# Patient Record
(demographics unavailable — no encounter records)

---

## 2025-03-07 NOTE — HISTORY OF PRESENT ILLNESS
[Father] : father [Grade ___] : Grade [unfilled] [Fruit] : fruit [Toilet Trained] : toilet trained [Normal] : Normal [In own bed] : In own bed [Brushing teeth] : Brushing teeth [Yes] : Patient goes to dentist yearly [Child Cooperates] : Child cooperates [No difficulties with Homework] : No difficulties with homework [Adequate performance] : Adequate performance [Adequate attention] : Adequate attention [No] : No cigarette smoke exposure [Carbon Monoxide Detectors] : Carbon monoxide detectors [Smoke Detectors] : Smoke detectors [Mother] : mother [Parent has appropriate responses to behavior] : Parent has appropriate responses to behavior [Up to date] : Up to date [Grains] : grains [Exposure to electronic nicotine delivery system] : No exposure to electronic nicotine delivery system [FreeTextEntry7] : NEW PATIENT TO THIS OFFICE.  BORN AT [facility], FT, , NO COMPLICATIONS, NO HOSPITALIZATIONS,NO SURGERIES [de-identified] : PER MOTHER, SEEN BY DERM FOR ECZEMA, SKIN TESTING + FOR PEANUT, SOY, SESAME, DUST MITE [de-identified] : NO VEGGIES, NO MEAT, NO EGGS, SOME BEANS WITH RICE, A LOT OF FRUIT, FRENCH FRIES CHEERIOS.  WATER, NO DAIRY ("DOESN'T LIKE ANYTHING MILKY" SO WON'T TAKE PEDIASURE).  NO BREAD, NO COOKIES, NO CHOCOLATE,  TAKES B12, VITAMIN D.  MOM RUBBED PEANUT ON HER LIPS ONCE WHEN SHE WAS LITTLE AND THERE WAS SOME MILD SWELLING.  DOES NOT EAT PEANUT, SESAME OR SOY IN GENERAL SO HAS NEVER WITNESSED ANY OTHER ALLERGIC REACTIONS.   DENIES ABD PAIN OR RECURRENT VOMITING.   [FreeTextEntry9] : SWIMMING, DRAMA, ART [de-identified] : [employer], LIKES MATH

## 2025-03-07 NOTE — DISCUSSION/SUMMARY
[Normal Growth] : growth [Normal Development] : development  [No Elimination Concerns] : elimination [Continue Regimen] : feeding [No Skin Concerns] : skin [Normal Sleep Pattern] : sleep [None] : no medical problems [School Readiness] : school readiness [Mental Health] : mental health [Nutrition and Physical Activity] : nutrition and physical activity [Oral Health] : oral health [Safety] : safety [Anticipatory Guidance Given] : Anticipatory guidance addressed as per the history of present illness section [No Medications] : ~He/She~ is not on any medications [FreeTextEntry1] : MOTHER DECLINES FLU VACCINE TODAY DESPITE COUNSELING.

## 2025-03-07 NOTE — HISTORY OF PRESENT ILLNESS
[Father] : father [Grade ___] : Grade [unfilled] [Fruit] : fruit [Toilet Trained] : toilet trained [Normal] : Normal [In own bed] : In own bed [Brushing teeth] : Brushing teeth [Yes] : Patient goes to dentist yearly [Child Cooperates] : Child cooperates [No difficulties with Homework] : No difficulties with homework [Adequate performance] : Adequate performance [Adequate attention] : Adequate attention [No] : No cigarette smoke exposure [Carbon Monoxide Detectors] : Carbon monoxide detectors [Smoke Detectors] : Smoke detectors [Mother] : mother [Parent has appropriate responses to behavior] : Parent has appropriate responses to behavior [Up to date] : Up to date [Grains] : grains [Exposure to electronic nicotine delivery system] : No exposure to electronic nicotine delivery system [FreeTextEntry7] : NEW PATIENT TO THIS OFFICE.  BORN AT [facility], FT, , NO COMPLICATIONS, NO HOSPITALIZATIONS,NO SURGERIES [de-identified] : PER MOTHER, SEEN BY DERM FOR ECZEMA, SKIN TESTING + FOR PEANUT, SOY, SESAME, DUST MITE [de-identified] : NO VEGGIES, NO MEAT, NO EGGS, SOME BEANS WITH RICE, A LOT OF FRUIT, FRENCH FRIES CHEERIOS.  WATER, NO DAIRY ("DOESN'T LIKE ANYTHING MILKY" SO WON'T TAKE PEDIASURE).  NO BREAD, NO COOKIES, NO CHOCOLATE,  TAKES B12, VITAMIN D.  MOM RUBBED PEANUT ON HER LIPS ONCE WHEN SHE WAS LITTLE AND THERE WAS SOME MILD SWELLING.  DOES NOT EAT PEANUT, SESAME OR SOY IN GENERAL SO HAS NEVER WITNESSED ANY OTHER ALLERGIC REACTIONS.   DENIES ABD PAIN OR RECURRENT VOMITING.   [FreeTextEntry9] : SWIMMING, DRAMA, ART [de-identified] : [employer], LIKES MATH

## 2025-03-07 NOTE — DEVELOPMENTAL MILESTONES
[Tells a story with a beginning,] : tells a story with a beginning, a middle, and an end [Rides a standard bike] : rides a standard bike [Draw a 12-part person] : draw a 12-part person [Normal Development] : Normal Development [Is dry day and night] : is dry day and night [Chooses preferred foods] : chooses preferred foods [Starts/continues conversation with peers] : starts/continues conversation with peers [Masters all consonant sounds and] : masters all consonant sounds and combinations, such as "d" or "ch" [FreeTextEntry1] : READS PERFORMS IN PLAYS

## 2025-03-07 NOTE — PHYSICAL EXAM
[Alert] : alert [No Acute Distress] : no acute distress [Normocephalic] : normocephalic [Conjunctivae with no discharge] : conjunctivae with no discharge [PERRL] : PERRL [EOMI Bilateral] : EOMI bilateral [Auricles Well Formed] : auricles well formed [Clear Tympanic membranes with present light reflex and bony landmarks] : clear tympanic membranes with present light reflex and bony landmarks [No Discharge] : no discharge [Nares Patent] : nares patent [Pink Nasal Mucosa] : pink nasal mucosa [Palate Intact] : palate intact [Nonerythematous Oropharynx] : nonerythematous oropharynx [Supple, full passive range of motion] : supple, full passive range of motion [No Palpable Masses] : no palpable masses [Symmetric Chest Rise] : symmetric chest rise [Clear to Auscultation Bilaterally] : clear to auscultation bilaterally [Regular Rate and Rhythm] : regular rate and rhythm [Normal S1, S2 present] : normal S1, S2 present [No Murmurs] : no murmurs [+2 Femoral Pulses] : +2 femoral pulses [Soft] : soft [NonTender] : non tender [Non Distended] : non distended [Normoactive Bowel Sounds] : normoactive bowel sounds [No Hepatomegaly] : no hepatomegaly [No Splenomegaly] : no splenomegaly [Sánchez: _____] : Sánchez [unfilled] [No Abnormal Lymph Nodes Palpated] : no abnormal lymph nodes palpated [No Gait Asymmetry] : no gait asymmetry [No pain or deformities with palpation of bone, muscles, joints] : no pain or deformities with palpation of bone, muscles, joints [Normal Muscle Tone] : normal muscle tone [Straight] : straight [de-identified] : SEVERE PAPULAR ECZEMA

## 2025-03-07 NOTE — PHYSICAL EXAM
[Alert] : alert [No Acute Distress] : no acute distress [Normocephalic] : normocephalic [Conjunctivae with no discharge] : conjunctivae with no discharge [PERRL] : PERRL [EOMI Bilateral] : EOMI bilateral [Auricles Well Formed] : auricles well formed [Clear Tympanic membranes with present light reflex and bony landmarks] : clear tympanic membranes with present light reflex and bony landmarks [No Discharge] : no discharge [Nares Patent] : nares patent [Pink Nasal Mucosa] : pink nasal mucosa [Palate Intact] : palate intact [Nonerythematous Oropharynx] : nonerythematous oropharynx [Supple, full passive range of motion] : supple, full passive range of motion [No Palpable Masses] : no palpable masses [Symmetric Chest Rise] : symmetric chest rise [Clear to Auscultation Bilaterally] : clear to auscultation bilaterally [Regular Rate and Rhythm] : regular rate and rhythm [Normal S1, S2 present] : normal S1, S2 present [No Murmurs] : no murmurs [+2 Femoral Pulses] : +2 femoral pulses [Soft] : soft [NonTender] : non tender [Non Distended] : non distended [Normoactive Bowel Sounds] : normoactive bowel sounds [No Hepatomegaly] : no hepatomegaly [No Splenomegaly] : no splenomegaly [Sánchez: _____] : Sánchez [unfilled] [No Abnormal Lymph Nodes Palpated] : no abnormal lymph nodes palpated [No Gait Asymmetry] : no gait asymmetry [No pain or deformities with palpation of bone, muscles, joints] : no pain or deformities with palpation of bone, muscles, joints [Normal Muscle Tone] : normal muscle tone [Straight] : straight [de-identified] : SEVERE PAPULAR ECZEMA

## 2025-05-13 NOTE — PHYSICAL EXAM
[Well Developed] : well developed [NAD] : in no acute distress [PERRL] : pupils were equal, round, reactive to light  [icteric] : anicteric [Moist & Pink Mucous Membranes] : moist and pink mucous membranes [CTAB] : lungs clear to auscultation bilaterally [Respiratory Distress] : no respiratory distress  [Regular Rate and Rhythm] : regular rate and rhythm [Normal S1, S2] : normal S1 and S2 [Soft] : soft  [Distended] : non distended [Tender] : non tender [Normal Bowel Sounds] : normal bowel sounds [No HSM] : no hepatosplenomegaly appreciated [Normal Tone] : normal tone [Well-Perfused] : well-perfused [Edema] : no edema [Cyanosis] : no cyanosis [Rash] : rash [Jaundice] : no jaundice [Interactive] : interactive

## 2025-05-13 NOTE — CONSULT LETTER
[Dear  ___] : Dear  [unfilled], [Consult Letter:] : I had the pleasure of evaluating your patient, [unfilled]. [Please see my note below.] : Please see my note below. [Consult Closing:] : Thank you very much for allowing me to participate in the care of this patient.  If you have any questions, please do not hesitate to contact me. [Sincerely,] : Sincerely, [FreeTextEntry3] : Alisia Dia MD Division of Pediatric Gastroenterology [facility]

## 2025-05-13 NOTE — ASSESSMENT
[FreeTextEntry1] : 6 year old female with elevated BMI, allergies with elevated IgE level and eosinophilia with restrictive eating without evidence for any gastrointestinal symptomatology without abdominal pain, chest pain, nausea, vomiting, dysphagia or food sticking.  Plan: dietary counseling agree with plan for allergy evaluation given elevated IgE and hypereosinophilia f/u with GI as clinically indicated

## 2025-05-13 NOTE — HISTORY OF PRESENT ILLNESS
[de-identified] : 6 year old female with selective eating. She eats only - rice, peas, french fries, fruits, cheerios, cheetos Drinks water and juice. No c/o abd pain. No N/V. No fever. BMs almost daily, [place] Eczematous skin rash. Lab assessment in April 2025 by PMD due to concerns regarding dietary limitation which was significant for elevated serum IgE level and peripheral eosinophilia on CBC Allergies to dust mites and nuts Mother nurse